# Patient Record
Sex: MALE | Race: BLACK OR AFRICAN AMERICAN | NOT HISPANIC OR LATINO | Employment: OTHER | URBAN - METROPOLITAN AREA
[De-identification: names, ages, dates, MRNs, and addresses within clinical notes are randomized per-mention and may not be internally consistent; named-entity substitution may affect disease eponyms.]

---

## 2019-07-09 PROBLEM — H40.003 OPEN ANGLE GLAUCOMA WITH BORDERLINE INTRAOCULAR PRESSURE, BILATERAL: Status: ACTIVE | Noted: 2019-07-09

## 2019-07-09 PROBLEM — E11.9 TYPE 2 DIABETES MELLITUS WITHOUT COMPLICATION, WITHOUT LONG-TERM CURRENT USE OF INSULIN: Status: ACTIVE | Noted: 2019-07-09

## 2019-07-09 PROBLEM — H25.813 COMBINED FORM OF AGE-RELATED CATARACT, BOTH EYES: Status: ACTIVE | Noted: 2019-07-09

## 2020-05-15 PROBLEM — H40.1131 PRIMARY OPEN ANGLE GLAUCOMA (POAG) OF BOTH EYES, MILD STAGE: Status: ACTIVE | Noted: 2020-05-15

## 2020-05-27 PROBLEM — H40.1132 PRIMARY OPEN ANGLE GLAUCOMA OF BOTH EYES, MODERATE STAGE: Status: ACTIVE | Noted: 2020-05-15

## 2020-05-27 PROBLEM — H40.20X0 NARROW ANGLE GLAUCOMA OF RIGHT EYE: Status: ACTIVE | Noted: 2020-05-27

## 2020-06-09 ENCOUNTER — NURSE TRIAGE (OUTPATIENT)
Dept: ADMINISTRATIVE | Facility: CLINIC | Age: 70
End: 2020-06-09

## 2020-06-09 NOTE — TELEPHONE ENCOUNTER
Reason for Disposition   [1] Follow-up call to recent contact AND [2] information only call, no triage required    Additional Information   Negative: [1] Caller is not with the adult (patient) AND [2] reporting urgent symptoms   Negative: Lab result questions   Negative: Medication questions   Negative: Caller can't be reached by phone   Negative: Caller has already spoken to PCP or another triager   Negative: RN needs further essential information from caller in order to complete triage   Negative: Requesting regular office appointment   Negative: [1] Caller requesting NON-URGENT health information AND [2] PCP's office is the best resource   Negative: Health Information question, no triage required and triager able to answer question   Negative: General information question, no triage required and triager able to answer question   Negative: Question about upcoming scheduled test, no triage required and triager able to answer question   Negative: [1] Caller is not with the adult (patient) AND [2] probable NON-URGENT symptoms    Protocols used: INFORMATION ONLY CALL-A-

## 2020-06-09 NOTE — TELEPHONE ENCOUNTER
Pt contacted through Post Procedural Symptom Tracking. Denies any cough, fever, or difficulty breathing since procedure.  Pt instructed to call OOC or contact provider with any changes of condition or concerns.

## 2020-09-05 DIAGNOSIS — U07.1 COVID-19 VIRUS DETECTED: ICD-10-CM

## 2020-09-23 PROBLEM — Z98.41 S/P CATARACT EXTRACTION AND INSERTION OF INTRAOCULAR LENS, RIGHT: Status: ACTIVE | Noted: 2020-09-23

## 2020-09-23 PROBLEM — Z96.1 S/P CATARACT EXTRACTION AND INSERTION OF INTRAOCULAR LENS, RIGHT: Status: ACTIVE | Noted: 2020-09-23

## 2020-09-23 PROBLEM — H25.812 COMBINED FORMS OF AGE-RELATED CATARACT OF LEFT EYE: Status: ACTIVE | Noted: 2019-07-09

## 2021-07-08 PROBLEM — Z98.42 S/P CATARACT EXTRACTION AND INSERTION OF INTRAOCULAR LENS, LEFT: Status: ACTIVE | Noted: 2021-07-08

## 2021-07-08 PROBLEM — H40.1132 PRIMARY OPEN ANGLE GLAUCOMA OF BOTH EYES, MODERATE STAGE: Status: ACTIVE | Noted: 2021-07-08

## 2021-07-08 PROBLEM — Z96.1 S/P CATARACT EXTRACTION AND INSERTION OF INTRAOCULAR LENS, LEFT: Status: ACTIVE | Noted: 2021-07-08

## 2021-07-10 PROBLEM — Z98.41 S/P CATARACT EXTRACTION AND INSERTION OF INTRAOCULAR LENS, RIGHT: Status: RESOLVED | Noted: 2020-09-23 | Resolved: 2021-07-10

## 2021-07-10 PROBLEM — Z98.42 S/P CATARACT EXTRACTION AND INSERTION OF INTRAOCULAR LENS, LEFT: Status: RESOLVED | Noted: 2021-07-08 | Resolved: 2021-07-10

## 2021-07-10 PROBLEM — Z96.1 S/P CATARACT EXTRACTION AND INSERTION OF INTRAOCULAR LENS, LEFT: Status: RESOLVED | Noted: 2021-07-08 | Resolved: 2021-07-10

## 2021-07-10 PROBLEM — H40.1131 PRIMARY OPEN ANGLE GLAUCOMA (POAG) OF BOTH EYES, MILD STAGE: Status: RESOLVED | Noted: 2020-05-15 | Resolved: 2021-07-10

## 2021-07-10 PROBLEM — Z96.1 S/P CATARACT EXTRACTION AND INSERTION OF INTRAOCULAR LENS, RIGHT: Status: RESOLVED | Noted: 2020-09-23 | Resolved: 2021-07-10

## 2021-07-10 PROBLEM — H40.20X0 NARROW ANGLE GLAUCOMA OF RIGHT EYE: Status: RESOLVED | Noted: 2020-05-27 | Resolved: 2021-07-10

## 2021-07-10 PROBLEM — H40.003 OPEN ANGLE GLAUCOMA WITH BORDERLINE INTRAOCULAR PRESSURE, BILATERAL: Status: RESOLVED | Noted: 2019-07-09 | Resolved: 2021-07-10

## 2021-07-10 PROBLEM — H25.812 COMBINED FORMS OF AGE-RELATED CATARACT OF LEFT EYE: Status: RESOLVED | Noted: 2019-07-09 | Resolved: 2021-07-10

## 2022-01-15 PROBLEM — I10 ESSENTIAL HYPERTENSION: Status: ACTIVE | Noted: 2022-01-15

## 2022-01-15 PROBLEM — Z86.69 HISTORY OF RETINAL TEAR: Status: ACTIVE | Noted: 2022-01-15

## 2022-01-15 PROBLEM — H35.033 HYPERTENSIVE RETINOPATHY OF BOTH EYES, GRADE 1: Status: ACTIVE | Noted: 2022-01-15

## 2023-11-30 ENCOUNTER — HOSPITAL ENCOUNTER (OUTPATIENT)
Dept: TELEMEDICINE | Facility: HOSPITAL | Age: 73
Discharge: HOME | End: 2023-11-30
Payer: MEDICARE

## 2023-11-30 PROCEDURE — 99447 PR INTERPROF, PHONE/INTERNET/EHR, CONSULT, 11-20 MINS: ICD-10-PCS | Mod: ,,, | Performed by: STUDENT IN AN ORGANIZED HEALTH CARE EDUCATION/TRAINING PROGRAM

## 2023-11-30 PROCEDURE — 99447 NTRPROF PH1/NTRNET/EHR 11-20: CPT | Mod: ,,, | Performed by: STUDENT IN AN ORGANIZED HEALTH CARE EDUCATION/TRAINING PROGRAM

## 2023-11-30 NOTE — SUBJECTIVE & OBJECTIVE
HPI:  73 y.o. male with a PMHx significant for HTN, DM presenting with right-sided weakness and dysarthria.     LKN this morning, unknown time.     H/o stroke 11/16 - left periventricular WM infarcts lacunar, multiple chronic lacunar infarcts    Aspirin    Sinus symptoms x 2 weeks    Last night drank several gin and tonics    Fell on his right side/pain/numbness/weakness    NCCTH not acute    Slurred speech, not moving right-side  Sensory intact             Images personally reviewed and interpreted:  Study: {Study:18305}  Study Interpretation: ***     Assessment and plan:  ***    Lytics recommendation: {THROMBOLYTIC THERAPY RECOMMENDATION:51859}  Thrombectomy recommendation: {INTERVENTIONAL REVASCULARIZATION CANDIDATE:32531}  Placement recommendation: {TELESTROKE DISPOSITION RECOMMENDATION:33104}

## 2023-11-30 NOTE — TELEMEDICINE CONSULT
[facility]  Vascular Neurology  Comprehensive Stroke Center  TeleVascular Neurology Interprofessional Consult Note           Consulting Provider: DILIA RANGEL   Patient Location: [facility] ED - [place] - TELEMEDICINE [facility] TRANSFER [facility]    Discussed over the phone with ED team through [facility].      Summary of patient's symptoms:  73 y.o. male with a PMHx significant for HTN, DM presenting with right-sided weakness and dysarthria.     LKN last night, unknown time. Drank several gin and tonics last night. Appears intoxicated per primary team. Fell on his right-side this morning. Wife is reporting right-sided weakness. ED team also noted slurred speech. NCCTH per ED team with no acute findings.     H/o recent stroke 11/16/23 - left periventricular lacunar WM infarcts, multiple chronic lacunar infarcts per ED report. He is reportedly not taking AP/AC medications.     Images personally reviewed and interpreted: No images are available for my review    Assessment and plan:  # Right-sided weakness and dysarthria - recrudescence vs new infarction  - Unknown baseline since his recent stroke, but overall concern for new/worsening deficits.   - He is out of the window for IV thrombolytics.   - Recommend loading clopidogrel 300mg x 1 now. Then start dual-antiplatelet therapy with ASA 81mg and clopidogrel 75mg daily for 21 days. Then continue ASA 81mg indefinitely.   - Recommend STAT CTA head/neck with contrast to evaluate for LVO or high-grade stenosis.  - If there is no high-risk vascular lesion, patient does not require transfer for acute IR intervention. As such, recommend transfer to nearest facility with MRI and neurology consultation/stroke work-up.        I spent approximately 15 minutes on this encounter. More than half of that time was spent communicating with the consulting provider and coordinating patient care.     Tonja Pierson MD, PhD        This encounter was  conducted as an interprofessional communication between providers at the [facility] and vascular neurologist. The interaction was completed over the phone or via secure messaging (electronic medical record - [facility] Secure Chat).     Once this note was completed, a written copy was sent back to the provider via fax or electronic medical record.